# Patient Record
Sex: MALE | Race: WHITE | NOT HISPANIC OR LATINO | Employment: STUDENT | URBAN - METROPOLITAN AREA
[De-identification: names, ages, dates, MRNs, and addresses within clinical notes are randomized per-mention and may not be internally consistent; named-entity substitution may affect disease eponyms.]

---

## 2023-03-30 ENCOUNTER — OFFICE VISIT (OUTPATIENT)
Dept: URGENT CARE | Facility: URGENT CARE | Age: 23
End: 2023-03-30
Payer: COMMERCIAL

## 2023-03-30 VITALS
TEMPERATURE: 99 F | SYSTOLIC BLOOD PRESSURE: 133 MMHG | OXYGEN SATURATION: 98 % | HEART RATE: 110 BPM | WEIGHT: 135 LBS | DIASTOLIC BLOOD PRESSURE: 78 MMHG | RESPIRATION RATE: 14 BRPM

## 2023-03-30 DIAGNOSIS — K62.5 RECTAL BLEEDING: Primary | ICD-10-CM

## 2023-03-30 PROCEDURE — 99203 OFFICE O/P NEW LOW 30 MIN: CPT | Performed by: FAMILY MEDICINE

## 2023-03-30 NOTE — PATIENT INSTRUCTIONS
Stool softener:    Metamucil or Citrucel.    Stay hydrated.    Contact clinic if symptoms persist for a referral to specialty.

## 2023-03-30 NOTE — PROGRESS NOTES
(K62.5) Rectal bleeding  (primary encounter diagnosis)  Comment:   Unremarkable exam today.  Plan:   We will try stool softeners, hydration, observation.  Recontact us if he has persistent symptoms and further work-up can be arranged.      CHIEF COMPLAINT    Rectal bleeding onset yesterday.      HISTORY    This is a 22-year-old young man who noticed red rectal bleeding beginning yesterday.  He had an episode this morning in which there was red blood and turned the toilet water red.    He is not having painful bowel movements.  Not having constipation.  No abdominal pain.    Usually has 1 bowel movement per day.      REVIEW OF SYSTEMS    No fever.  No breathing problems.  No chest pain.  No abdominal pain.  No rash or bruising.      EXAM  /78 (BP Location: Right arm, Patient Position: Sitting, Cuff Size: Adult Regular)   Pulse 110   Temp 99  F (37.2  C) (Temporal)   Resp 14   Wt 61.2 kg (135 lb)   SpO2 98%     Abdomen nondistended, nontender.  Rectal exam -no external hemorrhoids or other lesions, no fissures, digital exam without masses, no blood was present on examining glove.

## 2023-08-13 ENCOUNTER — HEALTH MAINTENANCE LETTER (OUTPATIENT)
Age: 23
End: 2023-08-13

## 2024-10-06 ENCOUNTER — HEALTH MAINTENANCE LETTER (OUTPATIENT)
Age: 24
End: 2024-10-06